# Patient Record
Sex: MALE | Race: ASIAN | NOT HISPANIC OR LATINO | Employment: STUDENT | URBAN - METROPOLITAN AREA
[De-identification: names, ages, dates, MRNs, and addresses within clinical notes are randomized per-mention and may not be internally consistent; named-entity substitution may affect disease eponyms.]

---
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
## 2024-10-30 ENCOUNTER — OFFICE VISIT (OUTPATIENT)
Dept: FAMILY MEDICINE CLINIC | Facility: HOSPITAL | Age: 19
End: 2024-10-30
Payer: COMMERCIAL

## 2024-10-30 VITALS
DIASTOLIC BLOOD PRESSURE: 78 MMHG | OXYGEN SATURATION: 99 % | HEART RATE: 97 BPM | WEIGHT: 176.6 LBS | TEMPERATURE: 98.2 F | HEIGHT: 69 IN | BODY MASS INDEX: 26.16 KG/M2 | SYSTOLIC BLOOD PRESSURE: 122 MMHG

## 2024-10-30 DIAGNOSIS — J06.9 UPPER RESPIRATORY TRACT INFECTION, UNSPECIFIED TYPE: Primary | ICD-10-CM

## 2024-10-30 DIAGNOSIS — J45.20 MILD INTERMITTENT ASTHMA WITHOUT COMPLICATION: ICD-10-CM

## 2024-10-30 PROBLEM — S00.83XA FACIAL CONTUSION, INITIAL ENCOUNTER: Status: RESOLVED | Noted: 2022-08-09 | Resolved: 2024-10-30

## 2024-10-30 LAB
SARS-COV-2 AG UPPER RESP QL IA: NEGATIVE
SL AMB POCT RAPID FLU A: NORMAL
SL AMB POCT RAPID FLU B: NORMAL
VALID CONTROL: NORMAL

## 2024-10-30 PROCEDURE — 99214 OFFICE O/P EST MOD 30 MIN: CPT | Performed by: FAMILY MEDICINE

## 2024-10-30 PROCEDURE — 87811 SARS-COV-2 COVID19 W/OPTIC: CPT | Performed by: FAMILY MEDICINE

## 2024-10-30 PROCEDURE — 87804 INFLUENZA ASSAY W/OPTIC: CPT | Performed by: FAMILY MEDICINE

## 2024-10-30 RX ORDER — ALBUTEROL SULFATE 90 UG/1
2 INHALANT RESPIRATORY (INHALATION) EVERY 6 HOURS PRN
Start: 2024-10-30

## 2024-10-30 NOTE — PROGRESS NOTES
Ambulatory Visit  Name: Lisandra Jimenes      : 2005      MRN: [id number]  Encounter Provider: Scott Soliman MD  Encounter Date: 10/30/2024   Encounter department: [facility] CARE [address]     Assessment & Plan  Upper respiratory tract infection, unspecified type  Consistent with URI, viral illness.  COVID and flu swabs are negative in the office.  He is describing some pleuritic chest pain.  Check chest x-ray evaluate for pneumonia.  Would also check to make sure were not dealing with a small pneumothorax.  Orders:    XR chest pa and lateral; Future    POCT Rapid Covid Ag    POCT rapid flu A and B    Mild intermittent asthma without complication  No exacerbation at this time.  He does have enough albuterol in case of an exacerbation.  He has not needed a controller in a while.  Orders:    XR chest pa and lateral; Future    albuterol (PROVENTIL HFA,VENTOLIN HFA) 90 mcg/act inhaler; Inhale 2 puffs every 6 (six) hours as needed for wheezing       History of Present Illness     Over the last 2 days Lisandra has had some coughing.  Has been having some right upper chest pain.  Increased when coughing.  Had a fever max of 102.4.  Has had some nausea and diarrhea.  No constipation.  Mild sore throat.  No ear pain.  No sinus pressure.  With runny nose.          Review of Systems   Constitutional:  Positive for activity change. Negative for appetite change, chills and diaphoresis.   HENT:  Positive for congestion and rhinorrhea. Negative for dental problem.    Respiratory:  Positive for cough. Negative for apnea, chest tightness, shortness of breath and wheezing.    Cardiovascular:  Positive for chest pain. Negative for palpitations and leg swelling.   Gastrointestinal: Negative.  Negative for abdominal distention, abdominal pain, constipation, diarrhea and nausea.   Genitourinary: Negative.  Negative for difficulty urinating, dysuria and frequency.           Objective     /78    "Pulse 97   Temp 98.2 °F (36.8 °C)   Ht 5' 9\" (1.753 m)   Wt 80.1 kg (176 lb 9.6 oz)   SpO2 99%   BMI 26.08 kg/m²     Physical Exam  Vitals reviewed.   Constitutional:       General: He is not in acute distress.     Appearance: He is well-developed. He is not ill-appearing.   HENT:      Head: Normocephalic and atraumatic.      Right Ear: Tympanic membrane, ear canal and external ear normal.      Left Ear: Tympanic membrane, ear canal and external ear normal.      Mouth/Throat:      Mouth: Mucous membranes are moist.      Pharynx: Oropharynx is clear.   Eyes:      Extraocular Movements: Extraocular movements intact.      Conjunctiva/sclera: Conjunctivae normal.      Pupils: Pupils are equal, round, and reactive to light.   Cardiovascular:      Rate and Rhythm: Normal rate and regular rhythm.      Heart sounds: Normal heart sounds. No murmur heard.  Pulmonary:      Effort: Pulmonary effort is normal.      Breath sounds: Normal breath sounds.   Abdominal:      General: Bowel sounds are normal. There is no distension.      Palpations: Abdomen is soft. There is no mass.      Tenderness: There is no abdominal tenderness. There is no guarding.      Hernia: No hernia is present.   Musculoskeletal:         General: Normal range of motion.      Cervical back: Normal range of motion and neck supple.   Skin:     General: Skin is warm and dry.      Capillary Refill: Capillary refill takes less than 2 seconds.   Neurological:      General: No focal deficit present.      Mental Status: He is alert and oriented to person, place, and time.   Psychiatric:         Mood and Affect: Mood normal.         Behavior: Behavior normal.         "

## 2024-10-30 NOTE — ASSESSMENT & PLAN NOTE
No exacerbation at this time.  He does have enough albuterol in case of an exacerbation.  He has not needed a controller in a while.  Orders:    XR chest pa and lateral; Future    albuterol (PROVENTIL HFA,VENTOLIN HFA) 90 mcg/act inhaler; Inhale 2 puffs every 6 (six) hours as needed for wheezing

## 2024-10-31 ENCOUNTER — HOSPITAL ENCOUNTER (OUTPATIENT)
Dept: RADIOLOGY | Facility: HOSPITAL | Age: 19
End: 2024-10-31
Payer: COMMERCIAL

## 2024-10-31 DIAGNOSIS — J45.20 MILD INTERMITTENT ASTHMA WITHOUT COMPLICATION: ICD-10-CM

## 2024-10-31 DIAGNOSIS — J06.9 UPPER RESPIRATORY TRACT INFECTION, UNSPECIFIED TYPE: ICD-10-CM

## 2024-10-31 PROCEDURE — 71046 X-RAY EXAM CHEST 2 VIEWS: CPT

## 2025-06-02 ENCOUNTER — TELEPHONE (OUTPATIENT)
Dept: DERMATOLOGY | Facility: CLINIC | Age: 20
End: 2025-06-02

## 2025-06-02 ENCOUNTER — OFFICE VISIT (OUTPATIENT)
Dept: FAMILY MEDICINE CLINIC | Facility: HOSPITAL | Age: 20
End: 2025-06-02
Payer: COMMERCIAL

## 2025-06-02 VITALS
BODY MASS INDEX: 25.33 KG/M2 | SYSTOLIC BLOOD PRESSURE: 122 MMHG | HEART RATE: 67 BPM | HEIGHT: 69 IN | TEMPERATURE: 98.9 F | DIASTOLIC BLOOD PRESSURE: 70 MMHG | OXYGEN SATURATION: 99 % | WEIGHT: 171 LBS

## 2025-06-02 DIAGNOSIS — N50.89 GENITAL LESION, MALE: Primary | ICD-10-CM

## 2025-06-02 PROCEDURE — 99213 OFFICE O/P EST LOW 20 MIN: CPT

## 2025-06-02 NOTE — PROGRESS NOTES
"Name: Lisandra Jimenes      : 2005      MRN: [id number]  Encounter Provider: Shalom Florence MD  Encounter Date: 2025   Encounter department: [facility] PRIMARY CARE [address]  :  Assessment & Plan  Genital lesion, male  1 to 2 weeks of raised erythematous genital lesion mostly on glans and extending slightly below.  Atypical appearance, etiology unclear, ASAP referral to Derm.  Denies any recent sexual activity.  Denies any penile discharge or dysuria    Orders:    Ambulatory Referral to Dermatology; Future           History of Present Illness   HPI  1-2 weeks of scaly itchy on genitals        Review of Systems   Constitutional:  Negative for chills and fever.   Endocrine: Negative for polyuria.   Genitourinary:  Positive for penile pain (itching). Negative for dysuria, genital sores, hematuria, penile discharge and penile swelling.   Neurological:  Negative for headaches.       Objective   /70   Pulse 67   Temp 98.9 °F (37.2 °C)   Ht 5' 9\" (1.753 m)   Wt 77.6 kg (171 lb)   SpO2 99%   BMI 25.25 kg/m²      Physical Exam  Constitutional:       General: He is not in acute distress.     Appearance: Normal appearance. He is not ill-appearing, toxic-appearing or diaphoretic.   HENT:      Head: Normocephalic.   Pulmonary:      Effort: No respiratory distress.   Genitourinary:     Comments: Raised erythematous lesion on glans penis and extending slightly below without ulceration    Neurological:      Mental Status: He is alert and oriented to person, place, and time.     Psychiatric:         Mood and Affect: Mood normal.         Behavior: Behavior normal.         "

## 2025-06-02 NOTE — TELEPHONE ENCOUNTER
I tried to call pt to get them scheduled in AMB HF, but had to l/v/m for them to call back.       Per Dr. Fernandez ok to OB appt.      Reason  penile pain (itching) - Jenny Fernandez MD

## 2025-07-01 ENCOUNTER — OFFICE VISIT (OUTPATIENT)
Dept: DERMATOLOGY | Facility: CLINIC | Age: 20
End: 2025-07-01
Payer: COMMERCIAL

## 2025-07-01 VITALS — WEIGHT: 170 LBS | HEIGHT: 70 IN | BODY MASS INDEX: 24.34 KG/M2

## 2025-07-01 DIAGNOSIS — R21 RASH: Primary | ICD-10-CM

## 2025-07-01 PROCEDURE — 99204 OFFICE O/P NEW MOD 45 MIN: CPT | Performed by: DERMATOLOGY

## 2025-07-01 RX ORDER — TRIAMCINOLONE ACETONIDE 1 MG/G
OINTMENT TOPICAL
Qty: 30 G | Refills: 2 | Status: SHIPPED | OUTPATIENT
Start: 2025-07-01

## 2025-07-01 NOTE — PROGRESS NOTES
"[facility] Dermatology Clinic Note     Patient Name: Lisandra Jimenes  Encounter Date: 7/1/25       Have you been cared for by a [facility] Dermatologist in the last 3 years and, if so, which description applies to you? NO. I am considered a \"new\" patient and must complete all patient intake questions. I am not of child-bearing potential.     REVIEW OF SYSTEMS:  Have you recently had or currently have any of the following? Recent fever or chills? No  Any non-healing wound? No   PAST MEDICAL HISTORY:  Have you personally ever had or currently have any of the following?  If \"YES,\" then please provide more detail. Skin cancer (such as Melanoma, Basal Cell Carcinoma, Squamous Cell Carcinoma?  No  Tuberculosis, HIV/AIDS, Hepatitis B or C: No  Radiation Treatment No   HISTORY OF IMMUNOSUPPRESSION:   Do you have a history of any of the following:  Systemic Immunosuppression such as Diabetes, Biologic or Immunotherapy, Chemotherapy, Organ Transplantation, Bone Marrow Transplantation or Prednisone?  No     Answering \"YES\" requires the addition of the dotphrase \"IMMUNOSUPPRESSED\" as the first diagnosis of the patient's visit.   FAMILY HISTORY:  Any \"first degree relatives\" (parent, brother, sister, or child) with the following?    Skin Cancer, Pancreatic or Other Cancer? No   PATIENT EXPERIENCE:    Do you want the Dermatologist to perform a COMPLETE skin exam today including a clinical examination under the \"bra and underwear\" areas?  NO  If necessary, do we have your permission to call and leave a detailed message on your Preferred Phone number that includes your specific medical information?  Yes      Allergies[1] Current Medications[2]              Whom besides the patient is providing clinical information about today's encounter?   NO ADDITIONAL HISTORIAN (patient alone provided history)    Physical Exam and Assessment/Plan by Diagnosis:         RASH - Psoriasis vs Contact Dermatitis    Physical Exam:  (Anatomic " Location); (Size and Morphological Description); (Differential Diagnosis):  Glans penis, erythematous plaque with mild overlying scale and mild lichenification  Pertinent Positives:  Pertinent Negatives:    Additional History of Present Condition:  Patient present for a rash that scales and flakes off on the head of the penis and top of shaft for about 4-5 weeks. Patients states it does itch and has minimal pain and has been putting Vaseline on the area. Has not tried any other topical treatments, and overall not very bothersome.     Assessment and Plan:  Based on a thorough discussion of this condition and the management approach to it (including a comprehensive discussion of the known risks, side effects and potential benefits of treatment), the patient (family) agrees to implement the following specific plan:  Triamcinolone 0.1% ointment twice a day for two weeks then take a week break and use as needed  Follow up as needed or call office if symptoms worsen or fail to improve      Scribe Attestation      I,:  Marilou Wyatt MA am acting as a scribe while in the presence of the attending physician.:       I,:  Rolanda He MD personally performed the services described in this documentation    as scribed in my presence.:              Camila Hancock MD  Dermatology, PGY-3           [1]   Allergies  Allergen Reactions    Dtap-Hepatitis B Recomb-Ipv Fever    Other Fever    Red Dye - Food Allergy    [2]   Current Outpatient Medications:     albuterol (PROVENTIL HFA,VENTOLIN HFA) 90 mcg/act inhaler, Inhale 2 puffs every 6 (six) hours as needed for wheezing (Patient not taking: Reported on 6/2/2025), Disp: , Rfl:     EPINEPHrine (EPIPEN) 0.3 mg/0.3 mL SOAJ, Inject 0.3 mL (0.3 mg total) into a muscle once for 1 dose, Disp: 1.2 mL, Rfl: 0

## 2025-07-17 ENCOUNTER — OFFICE VISIT (OUTPATIENT)
Dept: FAMILY MEDICINE CLINIC | Facility: HOSPITAL | Age: 20
End: 2025-07-17
Payer: COMMERCIAL

## 2025-07-17 VITALS
TEMPERATURE: 97.5 F | SYSTOLIC BLOOD PRESSURE: 126 MMHG | HEIGHT: 70 IN | OXYGEN SATURATION: 98 % | DIASTOLIC BLOOD PRESSURE: 72 MMHG | BODY MASS INDEX: 24.77 KG/M2 | WEIGHT: 173 LBS | HEART RATE: 66 BPM
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
## 2025-07-17 DIAGNOSIS — S40.262A TICK BITE OF LEFT SHOULDER, INITIAL ENCOUNTER: Primary | ICD-10-CM

## 2025-07-17 DIAGNOSIS — J45.20 MILD INTERMITTENT ASTHMA WITHOUT COMPLICATION: ICD-10-CM

## 2025-07-17 PROCEDURE — 99213 OFFICE O/P EST LOW 20 MIN: CPT | Performed by: NURSE PRACTITIONER

## 2025-07-17 NOTE — PROGRESS NOTES
"Name: Lisandra Jimenes      : 2005      MRN: [id number]  Encounter Provider: ARJUN Singh  Encounter Date: 2025   Encounter department: [facility] CARE [address]   :  Assessment & Plan  Tick bite of left shoulder, initial encounter  Reassured pt, tick bite site is consistent w/mild local reaction and no concerns for lyme evident  Reviewed sx's to monitor for and call with       Mild intermittent asthma without complication  Clinically stable/asymptomatic            History of Present Illness       Noted a tick on left shoulder 4-5 days ago. Removed easily and fully without difficulty. Doesn't feel it was there long and wasn't engorged. No ring or rash noted. States he feels well otherwise.         Review of Systems   Constitutional:  Negative for chills and fever.   Musculoskeletal:  Negative for arthralgias and myalgias.   Neurological:  Negative for headaches.       Objective   /72   Pulse 66   Temp 97.5 °F (36.4 °C)   Ht 5' 9.5\" (1.765 m)   Wt 78.5 kg (173 lb)   SpO2 98%   BMI 25.18 kg/m²          Physical Exam  Vitals reviewed.   Constitutional:       General: He is not in acute distress.     Appearance: Normal appearance.   HENT:      Head: Normocephalic.   Pulmonary:      Effort: Pulmonary effort is normal. No respiratory distress.     Skin:     General: Skin is warm and dry.      Findings: Lesion present.          Neurological:      General: No focal deficit present.      Mental Status: He is alert and oriented to person, place, and time.     Psychiatric:         Mood and Affect: Mood normal.         Behavior: Behavior normal.               Administrative Statements   I have spent a total time of 10 minutes in caring for this patient on the day of the visit/encounter including Instructions for management, Patient and family education, Impressions, Counseling / Coordination of care, Documenting in the medical record, and Obtaining or reviewing history    "